# Patient Record
Sex: FEMALE | Race: BLACK OR AFRICAN AMERICAN | NOT HISPANIC OR LATINO | Employment: FULL TIME | URBAN - METROPOLITAN AREA
[De-identification: names, ages, dates, MRNs, and addresses within clinical notes are randomized per-mention and may not be internally consistent; named-entity substitution may affect disease eponyms.]

---

## 2021-05-20 ENCOUNTER — TRANSCRIBE ORDERS (OUTPATIENT)
Dept: PHYSICAL THERAPY | Facility: CLINIC | Age: 41
End: 2021-05-20

## 2021-05-20 DIAGNOSIS — S66.912A HAND STRAIN, LEFT, INITIAL ENCOUNTER: ICD-10-CM

## 2021-05-20 DIAGNOSIS — S66.912A STRAIN OF LEFT WRIST, INITIAL ENCOUNTER: Primary | ICD-10-CM

## 2021-05-26 ENCOUNTER — TELEPHONE (OUTPATIENT)
Dept: PHYSICAL THERAPY | Facility: CLINIC | Age: 41
End: 2021-05-26

## 2021-06-02 ENCOUNTER — TREATMENT (OUTPATIENT)
Dept: PHYSICAL THERAPY | Facility: CLINIC | Age: 41
End: 2021-06-02

## 2021-06-02 DIAGNOSIS — S66.912A HAND STRAIN, LEFT, INITIAL ENCOUNTER: ICD-10-CM

## 2021-06-02 DIAGNOSIS — M25.532 LEFT WRIST PAIN: Primary | ICD-10-CM

## 2021-06-02 DIAGNOSIS — S66.912A STRAIN OF LEFT WRIST, INITIAL ENCOUNTER: ICD-10-CM

## 2021-06-02 PROCEDURE — 97014 ELECTRIC STIMULATION THERAPY: CPT | Performed by: PHYSICAL THERAPIST

## 2021-06-02 PROCEDURE — 97140 MANUAL THERAPY 1/> REGIONS: CPT | Performed by: PHYSICAL THERAPIST

## 2021-06-02 PROCEDURE — 97110 THERAPEUTIC EXERCISES: CPT | Performed by: PHYSICAL THERAPIST

## 2021-06-02 PROCEDURE — 97161 PT EVAL LOW COMPLEX 20 MIN: CPT | Performed by: PHYSICAL THERAPIST

## 2021-06-02 NOTE — PROGRESS NOTES
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; has been there ~ 1.5 years    'I'm over it; pain is still there, knot is still there, tired of wearing a bandage'     Pain with movement ; occasional tingling in fingers, occasional pain in elbow     Hx of injured right wrist, no surgery , 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        Patient Occupation: RA at assisted living facility  Pain  Current pain ratin  Quality: discomfort, sharp and dull ache  Relieving factors: heat, ice, change in position, support and medications  Aggravating factors: movement, lifting, prolonged positioning, outstretched reach, repetitive movement and sleeping  Progression: no change    Patient Goals  Patient goals for therapy: increased strength, independence with ADLs/IADLs, return to sport/leisure activities, return to work, increased motion, decreased pain and decreased edema             Objective          Observations     Left Wrist/Hand   Negative for edema.       Tenderness     Left Elbow   Tenderness in the lateral epicondyle.     Left Wrist/Hand   Tenderness in the common extensor tendon, lateral epicondyle and triangular fibrocartilage complex.     Additional Tenderness Details  Tender to left dorsal wrist    No tenderness in chai snuff box     Neurological Testing     Sensation     Wrist/Hand   Left   Intact: light touch    Right   Intact: light touch    Reflexes   Left   Biceps (C5/C6): normal (2+)  Brachioradialis (C6): normal (2+)  Triceps (C7): normal (2+)    Right   Biceps (C5/C6): normal (2+)  Brachioradialis (C6): normal (2+)  Triceps (C7): normal (2+)    Active Range of  Motion     Left Wrist   Normal active range of motion  Wrist flexion: with pain  Wrist extension: with pain    Right Wrist   Normal active range of motion    Additional Active Range of Motion Details  Mild pain at end range flexion/extension, supination    Strength/Myotome Testing     Left Wrist/Hand   Wrist extension: 4  Wrist flexion: 4  Radial deviation: 4  Ulnar deviation: 4     (2nd hand position)   lbs: 50    Thumb Strength  Key/Lateral Pinch    Trial 1: 18 lbs    Right Wrist/Hand   Normal wrist strength     (2nd hand position)   lbs: 63    Thumb Strength   Key/Lateral Pinch     Trial 1: 20 lbs    Tests     Left Wrist/Hand   Positive Finkelstein's, Phalen's sign and TFCC load.           Assessment & Plan     Assessment  Impairments: abnormal or restricted ROM, activity intolerance, impaired physical strength, lacks appropriate home exercise program and pain with function  Assessment details: Patient presents with s/s of left wrist contusion/sprain after an injury at work; she displays limited ROM and strength; she should benefit from PT to restore full function in order to RTW on full duty  Prognosis: fair  Functional Limitations: carrying objects, lifting, sleeping, pulling, pushing, uncomfortable because of pain and unable to perform repetitive tasks  Goals  Plan Goals: 3 weeks:  1. IND with HEP  2. Patient to display full AROM without pain  3. Patient to display 5/5 MMT and equal  strength bilaterally  4. Patient to perform work simulated tasks without pain    Plan  Therapy options: will be seen for skilled physical therapy services  Planned modality interventions: iontophoresis, TENS, thermotherapy (hydrocollator packs), ultrasound, high voltage pulsed current (pain management), electrical stimulation/Russian stimulation, dry needling, hydrotherapy and cryotherapy  Planned therapy interventions: manual therapy, neuromuscular re-education, soft tissue mobilization, strengthening, stretching,  therapeutic activities, home exercise program, joint mobilization, functional ROM exercises, flexibility, body mechanics training and fine motor coordination training  Frequency: 2x week  Duration in visits: 6  Treatment plan discussed with: patient         Access Code: [email or web address]  URL: [email or web address]  Date: 06/02/2021  Prepared by: Maicol Dawson    Exercises  Seated Forearm Pronation and Supination AROM - 3 x daily - 7 x weekly - 10 reps - 3 sets  Wrist AROM Flexion Extension - 3 x daily - 7 x weekly - 10 reps - 3 sets  Wrist Extension AROM - 3 x daily - 7 x weekly - 10 reps - 3 sets  Wrist Flexion AROM - 3 x daily - 7 x weekly - 10 reps - 3 sets  Finger MP Flexion AROM - 3 x daily - 7 x weekly - 10 reps - 3 sets  Wrist Circumduction AROM - 3 x daily - 7 x weekly - 10 reps - 3 sets  Finger Spreading - 3 x daily - 7 x weekly - 10 reps - 3 sets  Wrist AROM Radial Ulnar Deviation - 3 x daily - 7 x weekly - 10 reps - 3 sets  Thumb Abduction AROM on Table - 3 x daily - 7 x weekly - 3 sets - 10 reps    Manual Therapy:    10     mins  50440;  Therapeutic Exercise:    20     mins  96702;     Neuromuscular Bridger:        mins  04882;    Therapeutic Activity:          mins  49436;     Gait Training:           mins  30727;     Ultrasound:          mins  60462;    Electrical Stimulation:    15     mins  14210 ( );  Dry Needling          mins self-pay    Timed Treatment:   45   mins   Total Treatment:     60   mins    PT SIGNATURE: NORIS Dawson, PT   DATE TREATMENT INITIATED: 6/2/2021    Initial Certification  Certification Period: 8/31/2021  I certify that the therapy services are furnished while this patient is under my care.  The services outlined above are required by this patient, and will be reviewed every 90 days.     PHYSICIAN: Tahir Garza MD      DATE:     Please sign and return via fax to  .. Thank you, [facility].

## 2021-06-14 ENCOUNTER — TREATMENT (OUTPATIENT)
Dept: PHYSICAL THERAPY | Facility: CLINIC | Age: 41
End: 2021-06-14

## 2021-06-14 DIAGNOSIS — S66.912A STRAIN OF LEFT WRIST, INITIAL ENCOUNTER: Primary | ICD-10-CM

## 2021-06-14 PROCEDURE — 97014 ELECTRIC STIMULATION THERAPY: CPT | Performed by: PHYSICAL THERAPIST

## 2021-06-14 PROCEDURE — 97140 MANUAL THERAPY 1/> REGIONS: CPT | Performed by: PHYSICAL THERAPIST

## 2021-06-14 PROCEDURE — 97112 NEUROMUSCULAR REEDUCATION: CPT | Performed by: PHYSICAL THERAPIST

## 2021-06-14 PROCEDURE — 97110 THERAPEUTIC EXERCISES: CPT | Performed by: PHYSICAL THERAPIST

## 2021-06-14 NOTE — PROGRESS NOTES
Physical Therapy Daily Progress Note    TOTAL TIME: 60 MINUTES    Halle Choco reports: no show last visit for PT; had a rough week due to chronic migraines; lost my exercise sheets so I have not been doing the exercises; I did too much at work last night so it is more sore, did laundry and did some mopping after a spill      Objective   See Exercise, Manual, and Modality Logs for complete treatment.     THERAPEUTIC EXERCISES/ACTIVITIES ADDED TODAY: see below     Manual: left wrist light mobilizations x 10 minutes    Ice x 10 minutes after therapy     Pre mod estim with heat to dorsal wrist x 15 minutes     Access Code: [email or web address]  URL: [email or web address]  Date: 06/14/2021  Prepared by: Maicol Dawson    Exercises  Seated Forearm Pronation and Supination AROM - 3 x daily - 7 x weekly - 10 reps - 3 sets  Wrist AROM Flexion Extension - 3 x daily - 7 x weekly - 10 reps - 3 sets  Wrist Extension AROM - 3 x daily - 7 x weekly - 10 reps - 3 sets  Wrist Flexion AROM - 3 x daily - 7 x weekly - 10 reps - 3 sets  Finger MP Flexion AROM - 3 x daily - 7 x weekly - 10 reps - 3 sets  Wrist Circumduction AROM - 3 x daily - 7 x weekly - 10 reps - 3 sets  Finger Spreading - 3 x daily - 7 x weekly - 10 reps - 3 sets  Wrist AROM Radial Ulnar Deviation - 3 x daily - 7 x weekly - 10 reps - 3 sets  Thumb Abduction AROM on Table - 3 x daily - 7 x weekly - 3 sets - 10 reps    Assessment/Plan  Patient encouraged to not use the wrist excessively at work, as this could delay healing and recovery     Progress per Plan of Care and Progress strengthening /stabilization /functional activity           Manual Therapy:    10     mins  44205;  Therapeutic Exercise:    20     mins  93616;     Neuromuscular Bridger:    15    mins  42188;    Therapeutic Activity:          mins  16442;     Gait Training:           mins  75281;     Ultrasound:          mins  22940;    Electrical Stimulation:    15     mins  99965 ( );  Dry Needling           mins self-pay    Timed Treatment:   60   mins   Total Treatment:     60   mins    NORIS Dawson, PT  Physical Therapist